# Patient Record
Sex: MALE | Race: WHITE | Employment: FULL TIME | URBAN - METROPOLITAN AREA
[De-identification: names, ages, dates, MRNs, and addresses within clinical notes are randomized per-mention and may not be internally consistent; named-entity substitution may affect disease eponyms.]

---

## 2021-10-08 ENCOUNTER — HOSPITAL ENCOUNTER (OUTPATIENT)
Dept: GENERAL RADIOLOGY | Age: 41
Discharge: HOME | End: 2021-10-08
Payer: COMMERCIAL

## 2021-10-08 DIAGNOSIS — M79.602 LEFT ARM PAIN: ICD-10-CM

## 2021-10-08 PROCEDURE — 73030 X-RAY EXAM OF SHOULDER: CPT

## 2025-05-25 ENCOUNTER — APPOINTMENT (OUTPATIENT)
Dept: CT IMAGING | Age: 45
End: 2025-05-25

## 2025-05-25 ENCOUNTER — HOSPITAL ENCOUNTER (EMERGENCY)
Age: 45
Discharge: HOME | End: 2025-05-26
Attending: EMERGENCY MEDICINE

## 2025-05-25 DIAGNOSIS — R26.81 UNSTEADY GAIT: ICD-10-CM

## 2025-05-25 DIAGNOSIS — E87.1 HYPONATREMIA: ICD-10-CM

## 2025-05-25 DIAGNOSIS — F10.920 ACUTE ALCOHOLIC INTOXICATION WITHOUT COMPLICATION: Primary | ICD-10-CM

## 2025-05-25 DIAGNOSIS — F10.20 ALCOHOLISM (HCC): ICD-10-CM

## 2025-05-25 PROCEDURE — 70450 CT HEAD/BRAIN W/O DYE: CPT

## 2025-05-25 PROCEDURE — 99285 EMERGENCY DEPT VISIT HI MDM: CPT

## 2025-05-26 ENCOUNTER — APPOINTMENT (OUTPATIENT)
Dept: CT IMAGING | Age: 45
End: 2025-05-26

## 2025-05-26 ENCOUNTER — APPOINTMENT (OUTPATIENT)
Dept: GENERAL RADIOLOGY | Age: 45
End: 2025-05-26

## 2025-05-26 VITALS
HEART RATE: 88 BPM | RESPIRATION RATE: 15 BRPM | OXYGEN SATURATION: 96 % | TEMPERATURE: 98 F | SYSTOLIC BLOOD PRESSURE: 158 MMHG | DIASTOLIC BLOOD PRESSURE: 94 MMHG

## 2025-05-26 PROBLEM — E87.1 HYPONATREMIA: Status: ACTIVE | Noted: 2025-05-26

## 2025-05-26 LAB
ALBUMIN SERPL-MCNC: 4.3 G/DL (ref 3.4–5)
ALBUMIN/GLOB SERPL: 1.9 {RATIO} (ref 1.1–2.2)
ALP SERPL-CCNC: 62 U/L (ref 40–129)
ALT SERPL-CCNC: 17 U/L (ref 10–40)
AMPHETAMINES UR QL SCN>1000 NG/ML: ABNORMAL
ANION GAP SERPL CALCULATED.3IONS-SCNC: 12 MMOL/L (ref 3–16)
AST SERPL-CCNC: 21 U/L (ref 15–37)
BARBITURATES UR QL SCN>200 NG/ML: ABNORMAL
BASOPHILS # BLD: 0.1 K/UL (ref 0–0.2)
BASOPHILS NFR BLD: 0.9 %
BENZODIAZ UR QL SCN>200 NG/ML: ABNORMAL
BILIRUB SERPL-MCNC: 0.3 MG/DL (ref 0–1)
BUN SERPL-MCNC: 8 MG/DL (ref 7–20)
CALCIUM SERPL-MCNC: 8.8 MG/DL (ref 8.3–10.6)
CANNABINOIDS UR QL SCN>50 NG/ML: POSITIVE
CHLORIDE SERPL-SCNC: 91 MMOL/L (ref 99–110)
CO2 SERPL-SCNC: 24 MMOL/L (ref 21–32)
COCAINE UR QL SCN: ABNORMAL
CREAT SERPL-MCNC: 0.8 MG/DL (ref 0.9–1.3)
DEPRECATED RDW RBC AUTO: 12.1 % (ref 12.4–15.4)
DRUG SCREEN COMMENT UR-IMP: ABNORMAL
EOSINOPHIL # BLD: 0.2 K/UL (ref 0–0.6)
EOSINOPHIL NFR BLD: 1.9 %
ETHANOLAMINE SERPL-MCNC: 233 MG/DL (ref 0–0.08)
FENTANYL SCREEN, URINE: ABNORMAL
GFR SERPLBLD CREATININE-BSD FMLA CKD-EPI: >90 ML/MIN/{1.73_M2}
GLUCOSE BLD-MCNC: 90 MG/DL (ref 70–99)
GLUCOSE SERPL-MCNC: 83 MG/DL (ref 70–99)
HCT VFR BLD AUTO: 39.7 % (ref 40.5–52.5)
HGB BLD-MCNC: 14.2 G/DL (ref 13.5–17.5)
INR PPP: 0.96 (ref 0.85–1.15)
LYMPHOCYTES # BLD: 2.3 K/UL (ref 1–5.1)
LYMPHOCYTES NFR BLD: 25.3 %
MAGNESIUM SERPL-MCNC: 2.06 MG/DL (ref 1.8–2.4)
MCH RBC QN AUTO: 35.1 PG (ref 26–34)
MCHC RBC AUTO-ENTMCNC: 35.7 G/DL (ref 31–36)
MCV RBC AUTO: 98.4 FL (ref 80–100)
METHADONE UR QL SCN>300 NG/ML: ABNORMAL
MONOCYTES # BLD: 0.9 K/UL (ref 0–1.3)
MONOCYTES NFR BLD: 9.3 %
NEUTROPHILS # BLD: 5.8 K/UL (ref 1.7–7.7)
NEUTROPHILS NFR BLD: 62.6 %
OPIATES UR QL SCN>300 NG/ML: ABNORMAL
OXYCODONE UR QL SCN: ABNORMAL
PCP UR QL SCN>25 NG/ML: ABNORMAL
PERFORMED ON: NORMAL
PH UR STRIP: 5 [PH]
PLATELET # BLD AUTO: 288 K/UL (ref 135–450)
PMV BLD AUTO: 7.5 FL (ref 5–10.5)
POTASSIUM SERPL-SCNC: 3.6 MMOL/L (ref 3.5–5.1)
PROT SERPL-MCNC: 6.6 G/DL (ref 6.4–8.2)
PROTHROMBIN TIME: 13 SEC (ref 11.9–14.9)
RBC # BLD AUTO: 4.04 M/UL (ref 4.2–5.9)
SODIUM SERPL-SCNC: 127 MMOL/L (ref 136–145)
TROPONIN, HIGH SENSITIVITY: <6 NG/L (ref 0–22)
WBC # BLD AUTO: 9.2 K/UL (ref 4–11)

## 2025-05-26 PROCEDURE — 70496 CT ANGIOGRAPHY HEAD: CPT

## 2025-05-26 PROCEDURE — 6360000004 HC RX CONTRAST MEDICATION: Performed by: PHYSICIAN ASSISTANT

## 2025-05-26 PROCEDURE — 93005 ELECTROCARDIOGRAM TRACING: CPT | Performed by: PHYSICIAN ASSISTANT

## 2025-05-26 PROCEDURE — 85610 PROTHROMBIN TIME: CPT

## 2025-05-26 PROCEDURE — 80307 DRUG TEST PRSMV CHEM ANLYZR: CPT

## 2025-05-26 PROCEDURE — 82077 ASSAY SPEC XCP UR&BREATH IA: CPT

## 2025-05-26 PROCEDURE — 83735 ASSAY OF MAGNESIUM: CPT

## 2025-05-26 PROCEDURE — 80053 COMPREHEN METABOLIC PANEL: CPT

## 2025-05-26 PROCEDURE — 71045 X-RAY EXAM CHEST 1 VIEW: CPT

## 2025-05-26 PROCEDURE — 85025 COMPLETE CBC W/AUTO DIFF WBC: CPT

## 2025-05-26 PROCEDURE — 84484 ASSAY OF TROPONIN QUANT: CPT

## 2025-05-26 RX ORDER — IOPAMIDOL 755 MG/ML
75 INJECTION, SOLUTION INTRAVASCULAR
Status: COMPLETED | OUTPATIENT
Start: 2025-05-26 | End: 2025-05-26

## 2025-05-26 RX ADMIN — IOPAMIDOL 75 ML: 755 INJECTION, SOLUTION INTRAVENOUS at 00:26

## 2025-05-26 NOTE — ED NOTES
CODE STROKE Timeline    2350 - Called CT  2351 - Called  Stroke Team  RE: Gait ataxia per JYOTSNA Roth  2352 - Called lab  0004 - Dr. Beltrán with the  Stroke Team called back and spoke with JYOTSNA Roth

## 2025-05-26 NOTE — ED PROVIDER NOTES
[facility] EMERGENCY DEPARTMENT  EMERGENCY DEPARTMENT ENCOUNTER        Pt Name: Po Baugh  MRN: [id number]  Birthdate 1980  Date of evaluation: 5/25/2025  Provider: COSME BENNETT PA-C  PCP: Lulú Larry APRN - CNP  ED Attending: Hu Krishnamurthy MD       I have seen and evaluated this patient with my supervising physician Hu Krishnamurthy MD.    CHIEF COMPLAINT:     Chief Complaint   Patient presents with    Alcohol Intoxication     Wife called EMS for possible stroke like symptoms, leaning to the left for the last hour of the concert  Equal  per EMS   Walking with a drift to the left          HISTORY OF PRESENT ILLNESS:      History provided by the patient and his wife. No limitations.    Po Baugh is a 44 y.o. male who arrives to the ED by EMS.  The patient is brought in from [place].  He and his wife were attending the [employer].  At baseline the patient is an alcoholic.  He typically consumes 10-12 beers every day.  Tonight he drank even more than that, at least 16 beers.  The patient's wife states she has seen him drunk plenty of times and tonight seemed different.  She states that at about 10:30 PM they were standing at the concert and he kept leaning to the left.  She would tell him to straighten up but he continued to lean to the left.  He seemed unsteady.    Nursing Notes were reviewed     REVIEW OF SYSTEMS:     Review of Systems  Positives and pertinent negatives as per HPI.      PAST MEDICAL HISTORY:   No past medical history on file.    SURGICAL HISTORY:    No past surgical history on file.    CURRENT MEDICATIONS:       Previous Medications    No medications on file       ALLERGIES:    Patient has no known allergies.    FAMILY HISTORY:     @FAMHX    SOCIAL HISTORY:       Social History     Socioeconomic History    Marital status:        SCREENINGS:   NIH Stroke Scale  NIH Stroke Scale Assessed: Yes  Interval: Baseline  Level of 
no ataxia although he does seem to be favoring the left leg as if left antalgic gait as if there may be some hip issue going on with the left leg  NIHSS = 0.          The Ekg interpreted by me shows  normal sinus rhythm with a rate of 89  Axis is   Normal  QTc is  normal  Intervals and Durations are unremarkable.      ST Segments: Nonspecific changes  No significant change from prior EKG dated - no old EKG  No STEMI, poor R wave progression noted today    I did speak with Dr. Adler at 0006 who is the radiologist who reported no acute findings on CT of the head without contrast.      I independently interpreted the following study(s) labs which show ethanol level was 233.  Troponin was less than 6.  Urine drug screen was positive for marijuana.  He was hyponatremic at 127.  White blood cell count was normal range at 9.2.          I was the primary provider for the patient along with JYOTSNA Roth.    MDM:     NIHSS = 0. Negative test of skew  Overall normal ambulation and no ataxia concerns at this time but slight left antalgic gait.      Patient was evaluated due to concern for possible ataxia with him reportedly falling to the left prior to arrival.  When I watched him walk he walked steadily although did appear to be favoring his left hip and therefore this may be related to a left hip or low back issue.  He had normal strength in all 4 extremities.  I have lower suspicion for TIA/stroke at this point based on reassuring exam.  He had normal heel-to-shin bilaterally.  His ethanol level was elevated which could explain his slightly slurred speech but overall he seemed to be speaking normally.  He reports drinking every day.  At this point, since this is something new, he is aware that we could be missing a TIA/stroke although I do feel this is less likely but the only way to be for sure would be a brain MRI.  We could admit him for this to be closer to 100% sure he did not have a stroke or other etiology such as hip

## 2025-05-27 LAB
EKG ATRIAL RATE: 89 BPM
EKG DIAGNOSIS: NORMAL
EKG P AXIS: 78 DEGREES
EKG P-R INTERVAL: 142 MS
EKG Q-T INTERVAL: 364 MS
EKG QRS DURATION: 102 MS
EKG QTC CALCULATION (BAZETT): 442 MS
EKG R AXIS: 70 DEGREES
EKG T AXIS: 71 DEGREES
EKG VENTRICULAR RATE: 89 BPM

## 2025-05-27 PROCEDURE — 93010 ELECTROCARDIOGRAM REPORT: CPT | Performed by: INTERNAL MEDICINE
